# Patient Record
Sex: MALE | Race: OTHER | HISPANIC OR LATINO | URBAN - METROPOLITAN AREA
[De-identification: names, ages, dates, MRNs, and addresses within clinical notes are randomized per-mention and may not be internally consistent; named-entity substitution may affect disease eponyms.]

---

## 2020-03-24 ENCOUNTER — EMERGENCY (EMERGENCY)
Facility: HOSPITAL | Age: 50
LOS: 1 days | Discharge: HOME | End: 2020-03-24
Attending: EMERGENCY MEDICINE
Payer: SELF-PAY

## 2020-03-24 VITALS
TEMPERATURE: 100 F | RESPIRATION RATE: 18 BRPM | SYSTOLIC BLOOD PRESSURE: 122 MMHG | DIASTOLIC BLOOD PRESSURE: 92 MMHG | OXYGEN SATURATION: 99 % | HEART RATE: 87 BPM

## 2020-03-24 VITALS
OXYGEN SATURATION: 97 % | HEART RATE: 104 BPM | RESPIRATION RATE: 20 BRPM | HEIGHT: 61 IN | DIASTOLIC BLOOD PRESSURE: 67 MMHG | TEMPERATURE: 101 F | SYSTOLIC BLOOD PRESSURE: 124 MMHG | WEIGHT: 162.04 LBS

## 2020-03-24 LAB
ALBUMIN SERPL ELPH-MCNC: 4.7 G/DL — SIGNIFICANT CHANGE UP (ref 3.3–5)
ALP SERPL-CCNC: 72 U/L — SIGNIFICANT CHANGE UP (ref 40–120)
ALT FLD-CCNC: 48 U/L — HIGH (ref 10–45)
ANION GAP SERPL CALC-SCNC: 12 MMOL/L — SIGNIFICANT CHANGE UP (ref 5–17)
APTT BLD: 28.6 SEC — SIGNIFICANT CHANGE UP (ref 27.5–36.3)
AST SERPL-CCNC: 43 U/L — HIGH (ref 10–40)
BASOPHILS # BLD AUTO: 0.01 K/UL — SIGNIFICANT CHANGE UP (ref 0–0.2)
BASOPHILS NFR BLD AUTO: 0.2 % — SIGNIFICANT CHANGE UP (ref 0–2)
BILIRUB SERPL-MCNC: 0.3 MG/DL — SIGNIFICANT CHANGE UP (ref 0.2–1.2)
BUN SERPL-MCNC: 9 MG/DL — SIGNIFICANT CHANGE UP (ref 7–23)
CALCIUM SERPL-MCNC: 9.2 MG/DL — SIGNIFICANT CHANGE UP (ref 8.4–10.5)
CHLORIDE SERPL-SCNC: 98 MMOL/L — SIGNIFICANT CHANGE UP (ref 96–108)
CO2 SERPL-SCNC: 26 MMOL/L — SIGNIFICANT CHANGE UP (ref 22–31)
CREAT SERPL-MCNC: 1.01 MG/DL — SIGNIFICANT CHANGE UP (ref 0.5–1.3)
EOSINOPHIL # BLD AUTO: 0.04 K/UL — SIGNIFICANT CHANGE UP (ref 0–0.5)
EOSINOPHIL NFR BLD AUTO: 0.7 % — SIGNIFICANT CHANGE UP (ref 0–6)
GAS PNL BLDV: SIGNIFICANT CHANGE UP
GLUCOSE SERPL-MCNC: 102 MG/DL — HIGH (ref 70–99)
HCT VFR BLD CALC: 45.6 % — SIGNIFICANT CHANGE UP (ref 39–50)
HGB BLD-MCNC: 14.5 G/DL — SIGNIFICANT CHANGE UP (ref 13–17)
IMM GRANULOCYTES NFR BLD AUTO: 0.2 % — SIGNIFICANT CHANGE UP (ref 0–1.5)
INR BLD: 1.03 RATIO — SIGNIFICANT CHANGE UP (ref 0.88–1.16)
LYMPHOCYTES # BLD AUTO: 0.95 K/UL — LOW (ref 1–3.3)
LYMPHOCYTES # BLD AUTO: 17.6 % — SIGNIFICANT CHANGE UP (ref 13–44)
MCHC RBC-ENTMCNC: 30 PG — SIGNIFICANT CHANGE UP (ref 27–34)
MCHC RBC-ENTMCNC: 31.8 GM/DL — LOW (ref 32–36)
MCV RBC AUTO: 94.2 FL — SIGNIFICANT CHANGE UP (ref 80–100)
MONOCYTES # BLD AUTO: 0.74 K/UL — SIGNIFICANT CHANGE UP (ref 0–0.9)
MONOCYTES NFR BLD AUTO: 13.7 % — SIGNIFICANT CHANGE UP (ref 2–14)
NEUTROPHILS # BLD AUTO: 3.64 K/UL — SIGNIFICANT CHANGE UP (ref 1.8–7.4)
NEUTROPHILS NFR BLD AUTO: 67.6 % — SIGNIFICANT CHANGE UP (ref 43–77)
NRBC # BLD: 0 /100 WBCS — SIGNIFICANT CHANGE UP (ref 0–0)
PLATELET # BLD AUTO: 143 K/UL — LOW (ref 150–400)
POTASSIUM SERPL-MCNC: 4.3 MMOL/L — SIGNIFICANT CHANGE UP (ref 3.5–5.3)
POTASSIUM SERPL-SCNC: 4.3 MMOL/L — SIGNIFICANT CHANGE UP (ref 3.5–5.3)
PROT SERPL-MCNC: 7.8 G/DL — SIGNIFICANT CHANGE UP (ref 6–8.3)
PROTHROM AB SERPL-ACNC: 11.9 SEC — SIGNIFICANT CHANGE UP (ref 10–12.9)
RBC # BLD: 4.84 M/UL — SIGNIFICANT CHANGE UP (ref 4.2–5.8)
RBC # FLD: 13.4 % — SIGNIFICANT CHANGE UP (ref 10.3–14.5)
SODIUM SERPL-SCNC: 136 MMOL/L — SIGNIFICANT CHANGE UP (ref 135–145)
WBC # BLD: 5.39 K/UL — SIGNIFICANT CHANGE UP (ref 3.8–10.5)
WBC # FLD AUTO: 5.39 K/UL — SIGNIFICANT CHANGE UP (ref 3.8–10.5)

## 2020-03-24 PROCEDURE — 80053 COMPREHEN METABOLIC PANEL: CPT

## 2020-03-24 PROCEDURE — 82947 ASSAY GLUCOSE BLOOD QUANT: CPT

## 2020-03-24 PROCEDURE — 99284 EMERGENCY DEPT VISIT MOD MDM: CPT | Mod: 25

## 2020-03-24 PROCEDURE — 85014 HEMATOCRIT: CPT

## 2020-03-24 PROCEDURE — 85027 COMPLETE CBC AUTOMATED: CPT

## 2020-03-24 PROCEDURE — 83605 ASSAY OF LACTIC ACID: CPT

## 2020-03-24 PROCEDURE — 96374 THER/PROPH/DIAG INJ IV PUSH: CPT

## 2020-03-24 PROCEDURE — 82435 ASSAY OF BLOOD CHLORIDE: CPT

## 2020-03-24 PROCEDURE — 96361 HYDRATE IV INFUSION ADD-ON: CPT

## 2020-03-24 PROCEDURE — 84295 ASSAY OF SERUM SODIUM: CPT

## 2020-03-24 PROCEDURE — 85610 PROTHROMBIN TIME: CPT

## 2020-03-24 PROCEDURE — 71045 X-RAY EXAM CHEST 1 VIEW: CPT | Mod: 26

## 2020-03-24 PROCEDURE — 93005 ELECTROCARDIOGRAM TRACING: CPT

## 2020-03-24 PROCEDURE — 99285 EMERGENCY DEPT VISIT HI MDM: CPT

## 2020-03-24 PROCEDURE — 82803 BLOOD GASES ANY COMBINATION: CPT

## 2020-03-24 PROCEDURE — 84132 ASSAY OF SERUM POTASSIUM: CPT

## 2020-03-24 PROCEDURE — 85730 THROMBOPLASTIN TIME PARTIAL: CPT

## 2020-03-24 PROCEDURE — 87040 BLOOD CULTURE FOR BACTERIA: CPT

## 2020-03-24 PROCEDURE — 71045 X-RAY EXAM CHEST 1 VIEW: CPT

## 2020-03-24 PROCEDURE — 82330 ASSAY OF CALCIUM: CPT

## 2020-03-24 RX ORDER — SODIUM CHLORIDE 9 MG/ML
1000 INJECTION INTRAMUSCULAR; INTRAVENOUS; SUBCUTANEOUS ONCE
Refills: 0 | Status: COMPLETED | OUTPATIENT
Start: 2020-03-24 | End: 2020-03-24

## 2020-03-24 RX ORDER — AZITHROMYCIN 500 MG/1
500 TABLET, FILM COATED ORAL ONCE
Refills: 0 | Status: COMPLETED | OUTPATIENT
Start: 2020-03-24 | End: 2020-03-24

## 2020-03-24 RX ORDER — AZITHROMYCIN 500 MG/1
500 TABLET, FILM COATED ORAL ONCE
Refills: 0 | Status: DISCONTINUED | OUTPATIENT
Start: 2020-03-24 | End: 2020-03-24

## 2020-03-24 RX ORDER — AZITHROMYCIN 500 MG/1
1 TABLET, FILM COATED ORAL
Qty: 5 | Refills: 0
Start: 2020-03-24 | End: 2020-03-28

## 2020-03-24 RX ORDER — ACETAMINOPHEN 500 MG
975 TABLET ORAL ONCE
Refills: 0 | Status: COMPLETED | OUTPATIENT
Start: 2020-03-24 | End: 2020-03-24

## 2020-03-24 RX ORDER — SODIUM CHLORIDE 9 MG/ML
1500 INJECTION INTRAMUSCULAR; INTRAVENOUS; SUBCUTANEOUS ONCE
Refills: 0 | Status: COMPLETED | OUTPATIENT
Start: 2020-03-24 | End: 2020-03-24

## 2020-03-24 RX ORDER — CEFTRIAXONE 500 MG/1
1000 INJECTION, POWDER, FOR SOLUTION INTRAMUSCULAR; INTRAVENOUS ONCE
Refills: 0 | Status: COMPLETED | OUTPATIENT
Start: 2020-03-24 | End: 2020-03-24

## 2020-03-24 RX ADMIN — SODIUM CHLORIDE 1500 MILLILITER(S): 9 INJECTION INTRAMUSCULAR; INTRAVENOUS; SUBCUTANEOUS at 14:15

## 2020-03-24 RX ADMIN — Medication 975 MILLIGRAM(S): at 13:01

## 2020-03-24 RX ADMIN — SODIUM CHLORIDE 1000 MILLILITER(S): 9 INJECTION INTRAMUSCULAR; INTRAVENOUS; SUBCUTANEOUS at 15:37

## 2020-03-24 RX ADMIN — CEFTRIAXONE 100 MILLIGRAM(S): 500 INJECTION, POWDER, FOR SOLUTION INTRAMUSCULAR; INTRAVENOUS at 15:37

## 2020-03-24 RX ADMIN — SODIUM CHLORIDE 1500 MILLILITER(S): 9 INJECTION INTRAMUSCULAR; INTRAVENOUS; SUBCUTANEOUS at 13:02

## 2020-03-24 RX ADMIN — AZITHROMYCIN 500 MILLIGRAM(S): 500 TABLET, FILM COATED ORAL at 15:36

## 2020-03-24 RX ADMIN — Medication 975 MILLIGRAM(S): at 13:50

## 2020-03-24 NOTE — ED PROVIDER NOTE - ATTENDING CONTRIBUTION TO CARE
49y hisp male, PMH neg, No DM,HTN.Habits, comes to ed complains of 3d hx of tactile fever, cough productive of scant sputum, bodyaches. NO nvdc.loc, PE WDWN male with mild cough looking mildly fatigued. NCAT neck supple chest clear anterior & posterior abd soft +bs no mass guarding. cv no rubs, gallops or murmurs,neuro no focal defects  Leandro Julien MD, Facep

## 2020-03-24 NOTE — ED PROVIDER NOTE - PATIENT PORTAL LINK FT
You can access the FollowMyHealth Patient Portal offered by [facility] by registering at the following website: [email or web address]. By joining [employer]’s FollowMyHealth portal, you will also be able to view your health information using other applications (apps) compatible with our system.

## 2020-03-24 NOTE — ED PROVIDER NOTE - CLINICAL SUMMARY MEDICAL DECISION MAKING FREE TEXT BOX
In accordance with the current estabilished [facility] protocol for suspected COVID-19 patients, this patient does not meet criteria for urgent COVID-19 PCR testing in the ED. The patient will be advised to self-quarantine for 14 days and will be logged into our institutional [employer] database for surveillance.  will get CXR labs vbg

## 2020-03-24 NOTE — ED ADULT NURSE NOTE - OBJECTIVE STATEMENT
1310 49 yr old  c/o fever, cough, back pain, chest pain and SOB since last night. A&Ox4. ambulatory. Contact and airborne isolation maintained. denies recent travel or sick contacts.  Dry cough audible. Denies palpitations or dizziness.

## 2020-03-24 NOTE — ED PROVIDER NOTE - PHYSICAL EXAMINATION
Gen: AAOx3, non-toxic  Head: NCAT  HEENT: EOMI, PERRLA, oral mucosa moist, normal conjunctiva  Lung: CTAB, no respiratory distress, no wheezes/rhonchi/rales B/L, speaking in full sentences  CV: RRR, no murmurs, rubs or gallops  Abd: soft, NTND, no guarding, no CVA tenderness, no rebound tenderness  MSK: no visible deformities, full range of motion of all 4 exts  Neuro: No focal sensory or motor deficits  Skin: Warm, well perfused, no rash  Psych: normal affect.   ~Alisson Reeves MD

## 2020-03-24 NOTE — ED PROVIDER NOTE - NS ED ROS FT
GENERAL: + fever or chills, EYES: no change in vision, HEENT: no trouble speaking, CARDIAC: no chest pain, palpitation PULMONARY:+ cough or SOB, GI: no abdominal pain, no nausea, no vomiting, no diarrhea or constipation, : No changes in urination, SKIN: no rashes, NEURO: no headache,  MSK: + muscle pain ~Aluko Gift, MD

## 2020-03-24 NOTE — ED PROVIDER NOTE - PROGRESS NOTE DETAILS
Endorsed to Dr Danielle Julien MD, Facep Alisson Reeves MD: CXR showed PNA. Sat 99% on RA. Pt well appearing and asymptomatic. Pt is ambulatory and tolerating PO. Spoke with pt about return precautions. Pt agrees to follow up with their PCP. Pt ready for discharge This patient is a non-[employer] employee and was seen in the Emergency Department.  Staff did use appropiate PPE.

## 2020-03-24 NOTE — ED PROVIDER NOTE - NSFOLLOWUPINSTRUCTIONS_ED_ALL_ED_FT
1.  Take tylenol (650 mg by mouth) alternating with motrin (600 mg by mouth) every 3 hours.  (For ex.  Tylenol then 3 hours later motrin then 3 hours later tylenol etc.)  2.  Increase hydration of fluids (water, gatorade, broth, popsicles etc.).  Avoid alcohol while ill.  3.  Follow-up with primary care for reassessment in 2-3 days.  Also, please get the flu shot (if you have not already had one this season) at any pharmacy or from your primary care doctor once your symptoms resolve.   4.  Return to the ER should you develop high fevers greater than 103-104 or fevers unresponsive to tylenol/motrin, should you develop worsened shortness of breath or any respiratory distress including but not limited to inability to catch breath/inability to walk without marked shortness of breath or light-headedness, neck pain/stiffness, confusion, inability to tolerate oral intake or should you pass out.  5.  Please avoid any close or prolonged contact with other individuals, in particular: infants/elderly or individuals who are known to be immune suppressed.  6.  You were not tested for Coronavirus today as you do not require medical admission.  Should you develop any of the above changes to your symptoms please return to the emergency department as you may then require supportive care regardless of the source of your symptoms.     You received verbal instructions and did not sign because of the risk of infection, and expressed understanding of the discharge instructions. Please followup with your doctor (call) and consider follow up in their office. Please practice good hand hygiene and social distancing. If fever, cough, shortness of breath, or any worse symptoms return to the ER.  If you have symptoms of any kind please quarantine yourself for 14 days from start of symptoms.  You can call [phone] for any Covid related questions or your local department of health. ([facility] [phone], or [facility]).

## 2020-03-24 NOTE — ED PROVIDER NOTE - OBJECTIVE STATEMENT
Alisson Reeves MD: 48 yo M no PMH  presents out of a concern for a possible COVID infection. They have not recently travelled to any of the current high risk areas and have not been in close contact with a known positive COVID19-infected patient.  This patient complains of URI-like symptoms: XXX sore, throat, cough, body aches, runny nose.  Associated Symptoms:  no INGRAM, inability to breathe, edema, or chest pain.   Duration of symptoms:    Better/worse: Alisson Reeves MD: 48 yo M no PMH  presents out of a concern for a possible COVID infection. They have not recently travelled to any of the current high risk areas and have not been in close contact with a known positive COVID19-infected patient.  This patient complains of URI-like symptoms:  cough, body aches, runny nose.  Associated Symptoms:  no INGRAM, inability to breathe, edema, or chest pain.   Duration of symptoms:  3 days  Better/worse: same

## 2020-03-29 LAB
CULTURE RESULTS: SIGNIFICANT CHANGE UP
CULTURE RESULTS: SIGNIFICANT CHANGE UP
SPECIMEN SOURCE: SIGNIFICANT CHANGE UP
SPECIMEN SOURCE: SIGNIFICANT CHANGE UP

## 2022-04-24 ENCOUNTER — EMERGENCY (EMERGENCY)
Facility: HOSPITAL | Age: 52
LOS: 1 days | Discharge: HOME | End: 2022-04-24
Attending: CLINIC/CENTER
Payer: COMMERCIAL

## 2022-04-24 VITALS
HEART RATE: 75 BPM | TEMPERATURE: 98 F | HEIGHT: 61 IN | DIASTOLIC BLOOD PRESSURE: 87 MMHG | SYSTOLIC BLOOD PRESSURE: 125 MMHG | WEIGHT: 175.93 LBS | RESPIRATION RATE: 18 BRPM | OXYGEN SATURATION: 97 %

## 2022-04-24 PROCEDURE — 99283 EMERGENCY DEPT VISIT LOW MDM: CPT

## 2022-04-25 VITALS
HEART RATE: 76 BPM | RESPIRATION RATE: 15 BRPM | TEMPERATURE: 98 F | SYSTOLIC BLOOD PRESSURE: 106 MMHG | DIASTOLIC BLOOD PRESSURE: 72 MMHG | OXYGEN SATURATION: 99 %

## 2022-04-25 PROBLEM — Z78.9 OTHER SPECIFIED HEALTH STATUS: Chronic | Status: ACTIVE | Noted: 2020-03-24

## 2022-04-25 PROCEDURE — 99284 EMERGENCY DEPT VISIT MOD MDM: CPT

## 2022-04-25 RX ORDER — IBUPROFEN 200 MG
400 TABLET ORAL ONCE
Refills: 0 | Status: COMPLETED | OUTPATIENT
Start: 2022-04-25 | End: 2022-04-25

## 2022-04-25 RX ORDER — NITROGLYCERIN 6.5 MG
1 CAPSULE, EXTENDED RELEASE ORAL ONCE
Refills: 0 | Status: COMPLETED | OUTPATIENT
Start: 2022-04-25 | End: 2022-04-25

## 2022-04-25 RX ORDER — MINERAL OIL
133 OIL (ML) MISCELLANEOUS ONCE
Refills: 0 | Status: COMPLETED | OUTPATIENT
Start: 2022-04-25 | End: 2022-04-25

## 2022-04-25 RX ORDER — ACETAMINOPHEN 500 MG
650 TABLET ORAL ONCE
Refills: 0 | Status: COMPLETED | OUTPATIENT
Start: 2022-04-25 | End: 2022-04-25

## 2022-04-25 RX ORDER — MULTIVIT WITH MIN/MFOLATE/K2 340-15/3 G
1 POWDER (GRAM) ORAL ONCE
Refills: 0 | Status: COMPLETED | OUTPATIENT
Start: 2022-04-25 | End: 2022-04-25

## 2022-04-25 RX ADMIN — Medication 1 APPLICATION(S): at 02:17

## 2022-04-25 RX ADMIN — Medication 1 BOTTLE: at 01:19

## 2022-04-25 RX ADMIN — Medication 400 MILLIGRAM(S): at 01:20

## 2022-04-25 RX ADMIN — Medication 133 MILLILITER(S): at 03:23

## 2022-04-25 RX ADMIN — Medication 650 MILLIGRAM(S): at 01:20

## 2022-04-25 NOTE — ED ADULT NURSE NOTE - NSIMPLEMENTINTERV_GEN_ALL_ED
Implemented All Universal Safety Interventions:  [place] to call system. Call bell, personal items and telephone within reach. Instruct patient to call for assistance. Room bathroom lighting operational. Non-slip footwear when patient is off stretcher. Physically safe environment: no spills, clutter or unnecessary equipment. Stretcher in lowest position, wheels locked, appropriate side rails in place.

## 2022-04-25 NOTE — ED PROVIDER NOTE - NSFOLLOWUPINSTRUCTIONS_ED_ALL_ED_FT
You were seen today in the ED for anal pain. An external hemorrhoid was seen on rectal exam.   Hemorrhoids are swollen blood vessels inside your rectum (internal hemorrhoids) or on your anus (external hemorrhoids).    Please return to the ED if:  -You have severe pain in your rectum or around your anus.  -You have severe pain in your abdomen and you are vomiting.  -You have bleeding from your anus that soaks through your underwear.  -Your hemorrhoid looks or feels more swollen than usual/feels very hard  -You see or feel tissue coming through your anus.    You can use over the counter pain medications (Tylenol or NSAIDs such as Ibuprofen) as needed and topical creams to relieve your pain.     Avoid heavy lifting and straining during bowel movements. You can try eating foods high in fiber (fruits, vegetables, and whole grains) and drink plenty of fluids. You were seen today in the ED for anal pain. An external hemorrhoid was seen on rectal exam.   Hemorrhoids are swollen blood vessels inside your rectum (internal hemorrhoids) or on your anus (external hemorrhoids).    Please return to the ED if:  -You have severe pain in your rectum or around your anus.  -You have severe pain in your abdomen and you are vomiting.  -You have bleeding from your anus that soaks through your underwear.  -Your hemorrhoid looks or feels more swollen than usual/feels very hard  -You see or feel tissue coming through your anus.    You can use over the counter pain medications (Tylenol or NSAIDs such as Ibuprofen) as needed and topical creams to relieve your pain.     Avoid heavy lifting and straining during bowel movements. You can try eating foods high in fiber (fruits, vegetables, and whole grains) and drink plenty of fluids.    Las hemorroides son venas inflamadas adentro o alrededor del recto o del ano. Hay dos tipos de hemorroides:  •Hemorroides internas. Se tanya en las venas del interior del recto. Pueden abultarse hacia afuera, irritarse y doler.      •Hemorroides externas. Se producen en las venas externas del ano y pueden sentirse morgan un bulto o vanda hinchada, dura y dolorosa cerca del ano.      La mayoría de las hemorroides no causan problemas graves y se pueden controlar con tratamientos caseros morgan los cambios en la dieta y el estilo de cha. Si los tratamientos caseros no ayudan con los síntomas, se pueden realizar procedimientos para reducir o extirpar las hemorroides.      ¿Cuáles son las causas?    La causa de esta afección es el aumento de la presión en la vanda anal. Esta presión puede ser causada por distintos factores, por ejemplo:  •Estreñimiento.      •Hacer un gran esfuerzo para defecar.      •Diarrea.      •Embarazo.      •Obesidad.      •Estar sentado ben largos períodos de tiempo.      •Levantar objetos pesados u otras actividades que impliquen esfuerzo.      •Sexo anal.      •Andar en bicicleta por un zak período de tiempo.        ¿Cuáles son los signos o los síntomas?    Los síntomas de esta afección incluyen los siguientes:  •Dolor.      •Picazón o irritación anal.      •Sangrado rectal.      •Pérdida de materia fecal (heces).      •Inflamación anal.      •Alexander o más bultos alrededor del ano.        ¿Cómo se diagnostica?    Esta afección se diagnostica frecuentemente a través de un examen visual. Posiblemente le realicen otros tipos de pruebas o estudios, morgan los siguientes:  •Un examen que implica palpar el área rectal con la mano enguantada (examen rectal digital).      •Un examen del canal anal que se realiza utilizando un pequeño tubo (anoscopio).      •Análisis de eliseo si ha perdido emma cantidad significativa de eliseo.      •Emma prueba que consiste en la observación del interior del colon utilizando un tubo flexible con emma cámara en el extremo (sigmoidoscopia o colonoscopía).        ¿Cómo se trata?    Esta afección generalmente se puede tratar en el hogar. Sin embargo, se pueden realizar diversos procedimientos si los cambios en la dieta, en el estilo de cha y otros tratamientos caseros no alivian los síntomas. Estos procedimientos pueden ayudar a reducir o extirpar las hemorroides completamente. Algunos de estos procedimientos son quirúrgicos y otros no. Algunos de los procedimientos más frecuentes son los siguientes:  •Ligadura con calderón elástica. Las bandas elásticas se colocan en la base de las hemorroides para interrumpir oconnor irrigación de eliseo.      •Escleroterapia. Se inyecta un medicamento en las hemorroides para reducir oconnor tamaño.      •Coagulación con dayana infrarroja. Se utiliza un tipo de energía lumínica para eliminar las hemorroides.      •Hemorroidectomía. Las hemorroides se extirpan con cirugía y las venas que las irrigan se atan.      •Hemorroidopexia con grapas. El cirujano engrapa la base de las hemorroides a la pared del recto.        Siga estas indicaciones en oconnor casa:      Comida y bebida      •Consuma alimentos con alto contenido de fibra, morgan cereales integrales, porotos, abdulkadir secos, frutas y verduras.      •Pregúntele a oconnor médico acerca de nicky productos con fibra añadida en ellos (complementos de fibra).      •Disminuya la cantidad de grasa de la dieta. Crouch se puede lograr consumiendo productos lácteos con bajo contenido de grasas, ingiriendo johnathan cantidad de claudia jacinto y evitando los alimentos procesados.      •Meli suficiente líquido morgan para mantener la orina de color amarillo pálido.        Control del dolor y la hinchazón      •[place] felice de asiento tibios ben 20 minutos, 3 o 4 veces por día para calmar el dolor y las molestias. Puede hacer esto en emma bañera o usar un dispositivo portátil para baño de asiento que se coloca sobre el inodoro.    •Si se lo indican, aplique hielo en la vanda afectada. Usar compresas de hielo entre los felice de asiento puede ser efectivo.  •Ponga el hielo en emma bolsa plástica.      •Coloque emma toalla entre la piel y la bolsa.      •Coloque el hielo ben 20 minutos, 2 a 3 veces por día.        Indicaciones generales     •[place] los medicamentos de venta home y los recetados solamente morgan se lo haya indicado el médico.      •Aplíquese los medicamentos, cremas o supositorios morgan se lo hayan indicado.      •Robert ejercicio con regularidad. Consulte al médico qué cantidad y qué tipo de ejercicio es mejor para usted. En general, debe realizar al menos 30 minutos de ejercicio moderado la mayoría de los días de la semana (150 minutos cada semana). [place]ouch puede incluir actividades morgan caminar, andar en bicicleta o practicar yoga.      •Vaya al baño cuando sienta la necesidad de defecar. No espere.      •Evite hacer fuerza en las deposiciones.      •Mantenga la vanda anal limpia y seca. Use papel higiénico húmedo o toallitas humedecidas después de las deposiciones.      • No pase mucho tiempo sentado en el inodoro. [place] aumenta la afluencia de eliseo y el dolor.      •Concurra a todas las visitas de seguimiento morgan se lo haya indicado el médico. Crouch es importante.        Comuníquese con un médico si tiene:    •Aumento del dolor y la hinchazón que no puede controlar con medicamentos o tratamiento.      •No puede defecar o lo hace con dificultad.      •Dolor o tiene inflamación fuera de la vanda de las hemorroides.        Solicite ayuda inmediatamente si tiene:    •Hemorragia descontrolada en el recto.        Resumen    •Las hemorroides son venas inflamadas adentro o alrededor del recto o del ano.      •La mayoría de las hemorroides se pueden controlar con tratamientos caseros morgan cambios en la dieta y el estilo de cha.      •Nicky felice de asiento con agua tibia puede ayudar a aliviar el dolor y las molestias.      •En los casos graves, se pueden realizar procedimientos o emma cirugía para reducir o extirpar las hemorroides.      Esta información no tiene morgan fin reemplazar el consejo del médico. Asegúrese de hacerle al médico cualquier pregunta que tenga.

## 2022-04-25 NOTE — ED PROVIDER NOTE - PHYSICAL EXAMINATION
Gen: WDWN, comfortable appearing   HEENT: EOMI, no nasal discharge, mucous membranes moist  CV: RRR, +S1/S2, no M/R/G, equal b/l radial pulses 2+  Resp: CTAB, no W/R/R, no increased WOB   GI: Abdomen soft non-distended, NTTP, no masses/organomegaly   Rectal: Large external hemorrhoid; TTP with no blood; no evidence of thrombosis; normal skin color, soft. No internal hemorrhoids, no enlargement of prostate   MSK/Skin: No CVA tenderness, no open wounds, no bruising, no LE edema  Neuro: A&Ox4, moving all 4 extremities spontaneously, gross sensation intact in UE and LE BL  Psych: appropriate mood

## 2022-04-25 NOTE — ED PROVIDER NOTE - ATTENDING CONTRIBUTION TO CARE
I, Chaitanya Gabriel, performed a history and physical exam of the patient and discussed their management with the resident and /or advanced care provider. I reviewed the resident and /or ACP's note and agree with the documented findings and plan of care. I was present and available for all procedures.    Agree with above except noted:

## 2022-04-25 NOTE — ED ADULT NURSE NOTE - OBJECTIVE STATEMENT
51 y old male with no significant PMH presents to the ED from home c/o pain in rectum x 3 days. Pt reports pain got worse yesterday. Pt reports one abnormal bowel movement yesterday morning (4/24) and no bowel movements since then. Pt states pain is constant and nonradiating in rectum. Pt states "it feels like a bulge in my rectum." Denies HA, fevers, chills, CP, SOB, abd pain, n/v, weakness. Pt A&O x4, ambulatory. Respirations nonlabored on RA. Peripheral pulses strong. SKin warm, dry and intact. Bed locked in lowest position, side rails up and call bell in reach.

## 2022-04-25 NOTE — ED PROVIDER NOTE - PATIENT PORTAL LINK FT
You can access the FollowMyHealth Patient Portal offered by [facility] by registering at the following website: [email or web address]. By joining [employer]’s FollowMyHealth portal, you will also be able to view your health information using other applications (apps) compatible with our system.

## 2022-04-25 NOTE — ED PROVIDER NOTE - NS ED ROS FT
Gen: Denies fever  CV: Denies palpitations  Skin: Denies rash, erythema, color changes  Resp: Denies SOB, cough  Endo: Denies sensitivity to heat, cold  GI: Denies diarrhea, nausea, vomiting  Msk: Denies back pain  : Denies dysuria, increased frequency  Neuro: Denies weakness

## 2022-04-25 NOTE — ED PROVIDER NOTE - CLINICAL SUMMARY MEDICAL DECISION MAKING FREE TEXT BOX
52 y/o male with no pmhx presenting with rectal pain x 3 days, intermittently constipated; on exam large external hemorrhoid; TTP with no blood; no evidence of thrombosis; normal skin color, soft. No internal hemorrhoids, no enlargement of prostate. Abdomen NTTP. Pain control, laxatives/enema, and reassess

## 2022-04-25 NOTE — ED PROVIDER NOTE - OBJECTIVE STATEMENT
52 y/o male with no pmhx presenting with rectal pain x 3 days. Constant pain, worse with BMs, with no blood in stool. Intermittently constipated for past couple of days; Last BM 2 days ago. No fevers/chills, n/v/d, cough, rashes, or changes in urination. No hx of anal intercourse 52 y/o male with no pmhx presenting with rectal pain x 3 days. Constant pain, worse with BMs, with no blood in stool. Intermittently constipated for past couple of days; Last BM 2 days ago. No fevers/chills, n/v/d, cough, rashes, or changes in urination. No hx of anal intercourse.

## 2022-08-15 NOTE — ED ADULT NURSE NOTE - CAS TRG GEN SKIN CONDITION
Detail Level: Detailed Add 02487 Cpt? (Important Note: In 2017 The Use Of 04995 Is Being Tracked By Cms To Determine Future Global Period Reimbursement For Global Periods): yes Warm/Dry

## 2022-10-24 ENCOUNTER — EMERGENCY (EMERGENCY)
Facility: HOSPITAL | Age: 52
LOS: 1 days | Discharge: HOME | End: 2022-10-24
Attending: STUDENT IN AN ORGANIZED HEALTH CARE EDUCATION/TRAINING PROGRAM | Admitting: STUDENT IN AN ORGANIZED HEALTH CARE EDUCATION/TRAINING PROGRAM

## 2022-10-24 VITALS
TEMPERATURE: 98 F | RESPIRATION RATE: 16 BRPM | HEART RATE: 99 BPM | DIASTOLIC BLOOD PRESSURE: 70 MMHG | SYSTOLIC BLOOD PRESSURE: 110 MMHG | OXYGEN SATURATION: 100 %

## 2022-10-24 VITALS
SYSTOLIC BLOOD PRESSURE: 112 MMHG | DIASTOLIC BLOOD PRESSURE: 76 MMHG | RESPIRATION RATE: 16 BRPM | HEART RATE: 115 BPM | TEMPERATURE: 100 F | OXYGEN SATURATION: 96 %

## 2022-10-24 LAB
ALBUMIN SERPL ELPH-MCNC: 4.4 G/DL — SIGNIFICANT CHANGE UP (ref 3.3–5)
ALP SERPL-CCNC: 72 U/L — SIGNIFICANT CHANGE UP (ref 40–120)
ALT FLD-CCNC: 32 U/L — SIGNIFICANT CHANGE UP (ref 4–41)
ANION GAP SERPL CALC-SCNC: 16 MMOL/L — HIGH (ref 7–14)
AST SERPL-CCNC: 45 U/L — HIGH (ref 4–40)
BASOPHILS # BLD AUTO: 0.05 K/UL — SIGNIFICANT CHANGE UP (ref 0–0.2)
BASOPHILS NFR BLD AUTO: 0.5 % — SIGNIFICANT CHANGE UP (ref 0–2)
BILIRUB SERPL-MCNC: 0.3 MG/DL — SIGNIFICANT CHANGE UP (ref 0.2–1.2)
BUN SERPL-MCNC: 17 MG/DL — SIGNIFICANT CHANGE UP (ref 7–23)
CALCIUM SERPL-MCNC: 9.2 MG/DL — SIGNIFICANT CHANGE UP (ref 8.4–10.5)
CHLORIDE SERPL-SCNC: 100 MMOL/L — SIGNIFICANT CHANGE UP (ref 98–107)
CO2 SERPL-SCNC: 19 MMOL/L — LOW (ref 22–31)
CREAT SERPL-MCNC: 1.01 MG/DL — SIGNIFICANT CHANGE UP (ref 0.5–1.3)
EGFR: 91 ML/MIN/1.73M2 — SIGNIFICANT CHANGE UP
EOSINOPHIL # BLD AUTO: 0 K/UL — SIGNIFICANT CHANGE UP (ref 0–0.5)
EOSINOPHIL NFR BLD AUTO: 0 % — SIGNIFICANT CHANGE UP (ref 0–6)
GLUCOSE SERPL-MCNC: 119 MG/DL — HIGH (ref 70–99)
HCT VFR BLD CALC: 43.2 % — SIGNIFICANT CHANGE UP (ref 39–50)
HGB BLD-MCNC: 14.2 G/DL — SIGNIFICANT CHANGE UP (ref 13–17)
IANC: 7.59 K/UL — HIGH (ref 1.8–7.4)
IMM GRANULOCYTES NFR BLD AUTO: 0.5 % — SIGNIFICANT CHANGE UP (ref 0–0.9)
LYMPHOCYTES # BLD AUTO: 1.03 K/UL — SIGNIFICANT CHANGE UP (ref 1–3.3)
LYMPHOCYTES # BLD AUTO: 11.1 % — LOW (ref 13–44)
MCHC RBC-ENTMCNC: 30.7 PG — SIGNIFICANT CHANGE UP (ref 27–34)
MCHC RBC-ENTMCNC: 32.9 GM/DL — SIGNIFICANT CHANGE UP (ref 32–36)
MCV RBC AUTO: 93.3 FL — SIGNIFICANT CHANGE UP (ref 80–100)
MONOCYTES # BLD AUTO: 0.58 K/UL — SIGNIFICANT CHANGE UP (ref 0–0.9)
MONOCYTES NFR BLD AUTO: 6.2 % — SIGNIFICANT CHANGE UP (ref 2–14)
NEUTROPHILS # BLD AUTO: 7.59 K/UL — HIGH (ref 1.8–7.4)
NEUTROPHILS NFR BLD AUTO: 81.7 % — HIGH (ref 43–77)
NRBC # BLD: 0 /100 WBCS — SIGNIFICANT CHANGE UP (ref 0–0)
NRBC # FLD: 0 K/UL — SIGNIFICANT CHANGE UP (ref 0–0)
PLATELET # BLD AUTO: 193 K/UL — SIGNIFICANT CHANGE UP (ref 150–400)
POTASSIUM SERPL-MCNC: 5.3 MMOL/L — SIGNIFICANT CHANGE UP (ref 3.5–5.3)
POTASSIUM SERPL-SCNC: 5.3 MMOL/L — SIGNIFICANT CHANGE UP (ref 3.5–5.3)
PROT SERPL-MCNC: 7.9 G/DL — SIGNIFICANT CHANGE UP (ref 6–8.3)
RBC # BLD: 4.63 M/UL — SIGNIFICANT CHANGE UP (ref 4.2–5.8)
RBC # FLD: 13 % — SIGNIFICANT CHANGE UP (ref 10.3–14.5)
SODIUM SERPL-SCNC: 135 MMOL/L — SIGNIFICANT CHANGE UP (ref 135–145)
TROPONIN T, HIGH SENSITIVITY RESULT: 7 NG/L — SIGNIFICANT CHANGE UP
WBC # BLD: 9.3 K/UL — SIGNIFICANT CHANGE UP (ref 3.8–10.5)
WBC # FLD AUTO: 9.3 K/UL — SIGNIFICANT CHANGE UP (ref 3.8–10.5)

## 2022-10-24 PROCEDURE — 93010 ELECTROCARDIOGRAM REPORT: CPT

## 2022-10-24 PROCEDURE — 99285 EMERGENCY DEPT VISIT HI MDM: CPT

## 2022-10-24 PROCEDURE — 71045 X-RAY EXAM CHEST 1 VIEW: CPT | Mod: 26

## 2022-10-24 RX ORDER — ACETAMINOPHEN 500 MG
650 TABLET ORAL ONCE
Refills: 0 | Status: COMPLETED | OUTPATIENT
Start: 2022-10-24 | End: 2022-10-24

## 2022-10-24 RX ORDER — SODIUM CHLORIDE 9 MG/ML
1000 INJECTION INTRAMUSCULAR; INTRAVENOUS; SUBCUTANEOUS ONCE
Refills: 0 | Status: COMPLETED | OUTPATIENT
Start: 2022-10-24 | End: 2022-10-24

## 2022-10-24 RX ORDER — IPRATROPIUM/ALBUTEROL SULFATE 18-103MCG
3 AEROSOL WITH ADAPTER (GRAM) INHALATION ONCE
Refills: 0 | Status: COMPLETED | OUTPATIENT
Start: 2022-10-24 | End: 2022-10-24

## 2022-10-24 RX ORDER — ALBUTEROL 90 UG/1
2 AEROSOL, METERED ORAL
Qty: 1 | Refills: 0
Start: 2022-10-24 | End: 2022-10-30

## 2022-10-24 RX ORDER — KETOROLAC TROMETHAMINE 30 MG/ML
15 SYRINGE (ML) INJECTION ONCE
Refills: 0 | Status: DISCONTINUED | OUTPATIENT
Start: 2022-10-24 | End: 2022-10-24

## 2022-10-24 RX ADMIN — Medication 650 MILLIGRAM(S): at 20:32

## 2022-10-24 RX ADMIN — Medication 3 MILLILITER(S): at 20:32

## 2022-10-24 RX ADMIN — Medication 3 MILLILITER(S): at 22:01

## 2022-10-24 RX ADMIN — Medication 15 MILLIGRAM(S): at 20:31

## 2022-10-24 RX ADMIN — Medication 50 MILLIGRAM(S): at 22:01

## 2022-10-24 RX ADMIN — SODIUM CHLORIDE 1000 MILLILITER(S): 9 INJECTION INTRAMUSCULAR; INTRAVENOUS; SUBCUTANEOUS at 20:32

## 2022-10-24 NOTE — ED PROVIDER NOTE - PATIENT PORTAL LINK FT
You can access the FollowMyHealth Patient Portal offered by [facility] by registering at the following website: [email or web address]. By joining [employer]’s FollowMyHealth portal, you will also be able to view your health information using other applications (apps) compatible with our system.

## 2022-10-24 NOTE — ED PROVIDER NOTE - NSFOLLOWUPINSTRUCTIONS_ED_ALL_ED_FT
1. TAKE ALL MEDICATIONS AS DIRECTED.    2. FOR PAIN OR FEVER YOU CAN TAKE IBUPROFEN (MOTRIN, ADVIL) OR ACETAMINOPHEN (TYLENOL) AS NEEDED, AS DIRECTED ON PACKAGING.  3. FOLLOW UP WITH YOUR PRIMARY DOCTOR WITHIN 5 DAYS AS DIRECTED.  4. IF YOU HAD LABS OR IMAGING DONE, YOU WERE GIVEN COPIES OF ALL LABS AND/OR IMAGING RESULTS FROM YOUR ER VISIT--PLEASE TAKE THEM WITH YOU TO YOUR FOLLOW UP APPOINTMENTS.  5. IF NEEDED, CALL PATIENT ACCESS SERVICES AT [phone] TO FIND A PRIMARY CARE PHYSICIAN.  OR CALL [phone] TO MAKE AN APPOINTMENT WITH THE CLINIC.  6. RETURN TO THE ER FOR ANY WORSENING SYMPTOMS OR CONCERNS.    Take prednisone once a day for 4 days starting tomorrow. USe albuterol inhaler every 4-6 hours as needed for cough and wheezing  Return to ED for worsening symptoms, persistent fevers, difficulty breathing or other concerns      [facility]                                                                                                Upper Respiratory Infection, Adult      An upper respiratory infection (URI) is a common viral infection of the nose, throat, and upper air passages that lead to the lungs. The most common type of URI is the common cold. URIs usually get better on their own, without medical treatment.      What are the causes?    A URI is caused by a virus. You may catch a virus by:  •Breathing in droplets from an infected person's cough or sneeze.      •Touching something that has been exposed to the virus (is contaminated) and then touching your mouth, nose, or eyes.        What increases the risk?    You are more likely to get a URI if:  •You are very young or very old.      •You have close contact with others, such as at work, school, or a health care facility.      •You smoke.      •You have long-term (chronic) heart or lung disease.      •You have a weakened disease-fighting system (immune system).      •You have nasal allergies or asthma.      •You are experiencing a lot of stress.      •You have poor nutrition.        What are the signs or symptoms?    A URI usually involves some of the following symptoms:  •Runny or stuffy (congested) nose.      •Cough.      •Sneezing.      •Sore throat.      •Headache.      •Fatigue.      •Fever.      •Loss of appetite.      •Pain in your forehead, behind your eyes, and over your cheekbones (sinus pain).      •Muscle aches.      •Redness or irritation of the eyes.      •Pressure in the ears or face.        How is this diagnosed?    This condition may be diagnosed based on your medical history and symptoms, and a physical exam. Your health care provider may use a swab to take a mucus sample from your nose (nasal swab). This sample can be tested to determine what virus is causing the illness.      How is this treated?    URIs usually get better on their own within 7–10 days. Medicines cannot cure URIs, but your health care provider may recommend certain medicines to help relieve symptoms, such as:  •Over-the-counter cold medicines.      •Cough suppressants. Coughing is a type of defense against infection that helps to clear the respiratory system, so take these medicines only as recommended by your health care provider.      •Fever-reducing medicines.        Follow these instructions at home:    Activity     •Rest as needed.      •If you have a fever, stay home from work or school until your fever is gone or until your health care provider says your URI cannot spread to other people (is no longer contagious). Your health care provider may have you wear a face mask to prevent your infection from spreading.      Relieving symptoms     •Gargle with a mixture of salt and water 3–4 times a day or as needed. To make salt water, completely dissolve ½–1 tsp (3–6 g) of salt in 1 cup (237 mL) of warm water.      •Use a cool-mist humidifier to add moisture to the air. This can help you breathe more easily.        Eating and drinking   A comparison of three sample cups showing dark yellow, yellow, and pale yellow urine.   •Drink enough fluid to keep your urine pale yellow.      •Eat soups and other clear broths.        General instructions   A sign showing that a person should not smoke.   •Take over-the-counter and prescription medicines only as told by your health care provider. These include cold medicines, fever reducers, and cough suppressants.      • Do not use any products that contain nicotine or tobacco. These products include cigarettes, chewing tobacco, and vaping devices, such as e-cigarettes. If you need help quitting, ask your health care provider.      •Stay away from secondhand smoke.      •Stay up to date on all immunizations, including the yearly (annual) flu vaccine.      •Keep all follow-up visits. This is important.        How to prevent the spread of infection to others   Washing hands with soap and water.   URIs can be contagious. To prevent the infection from spreading:  •Wash your hands with soap and water for at least 20 seconds. If soap and water are not available, use hand .      •Avoid touching your mouth, face, eyes, or nose.      •Cough or sneeze into a tissue or your sleeve or elbow instead of into your hand or into the air.        Contact a health care provider if:    •You are getting worse instead of better.      •You have a fever or chills.      •Your mucus is brown or red.      •You have yellow or brown discharge coming from your nose.      •You have pain in your face, especially when you bend forward.      •You have swollen neck glands.      •You have pain while swallowing.      •You have white areas in the back of your throat.        Get help right away if:    •You have shortness of breath that gets worse.    •You have severe or persistent:  •Headache.      •Ear pain.      •Sinus pain.      •Chest pain.      •You have chronic lung disease along with any of the following:  •Making high-pitched whistling sounds when you breathe, most often when you breathe out (wheezing).      •Prolonged cough (more than 14 days).      •Coughing up blood.      •A change in your usual mucus.        •You have a stiff neck.    •You have changes in your:  •Vision.      •Hearing.      •Thinking.      •Mood.        These symptoms may be an emergency. Get help right away. Call 911.   • Do not wait to see if the symptoms will go away.       • Do not drive yourself to the hospital.         Summary    •An upper respiratory infection (URI) is a common infection of the nose, throat, and upper air passages that lead to the lungs.      •A URI is caused by a virus.      •URIs usually get better on their own within 7–10 days.      •Medicines cannot cure URIs, but your health care provider may recommend certain medicines to help relieve symptoms.      This information is not intended to replace advice given to you by your health care provider. Make sure you discuss any questions you have with your health care provider.      Document Revised: 07/20/2022 Document Reviewed: 07/20/2022    Elsevier Patient Education © 2022 [employer]

## 2022-10-24 NOTE — ED PROVIDER NOTE - PHYSICAL EXAMINATION
Gen: Well appearing in NAD  Head: NC/AT  Neck: trachea midline  cv: tachycardic   Resp:  No distress, + diffuse wheezing  abd nontender   Ext: no deformities  Neuro:  A&O appears non focal  Skin:  Warm and dry as visualized  Psych:  Normal affect and mood

## 2022-10-24 NOTE — ED ADULT NURSE NOTE - OBJECTIVE STATEMENT
Pt received to intake room 3. Pt is A&Ox4, ambulatory at baseline. Pt with c/o flu like symptoms x1day. C/o fevers, chills, cough and headache. Denies any sick contacts. RR even and unlabored, pt satting 98% on RA. IV access established with 20G to L AC, labs sent as per orders. Pt in NAD. Duoneb in progress. Stretcher locked and in lowest position, safety measures in place

## 2022-10-24 NOTE — ED PROVIDER NOTE - OBJECTIVE STATEMENT
53yo M ho ?Asthma, pw 1 day fevers, body aches, back pain, neck pain, headache, cough, chest pain, pt feeling weak, called ems, did not take any pain meds or antipyretics

## 2022-10-24 NOTE — ED PROVIDER NOTE - CLINICAL SUMMARY MEDICAL DECISION MAKING FREE TEXT BOX
53yo M with fevers, aches, cough, likely viral syndrome, will check basic labs, xr, ekg, supportive care

## 2022-10-25 LAB
FLUAV AG NPH QL: SIGNIFICANT CHANGE UP
FLUBV AG NPH QL: SIGNIFICANT CHANGE UP
RSV RNA NPH QL NAA+NON-PROBE: SIGNIFICANT CHANGE UP
SARS-COV-2 RNA SPEC QL NAA+PROBE: DETECTED

## 2025-06-06 NOTE — ED ADULT NURSE NOTE - PMH
Ari Snider is a 89 y.o. male admitted for  Principal Problem:    Acute diastolic CHF (congestive heart failure) (HCC)  Active Problems:    Chest pain    Acute on chronic systolic CHF (congestive heart failure) (HCC)  Resolved Problems:    * No resolved hospital problems. *  .   Patient Home via family with   Chief Complaint   Patient presents with    Chest Pain     On and off since 0930 this morning. Hx MI.    .  Patient is alert and Person, Place, Time, and Situation  Patient's baseline mobility: with assistance  Code Status: Prior   Cardiac Rhythm:       Is patient on baseline Oxygen: no how many Liters:   Abnormal Assessment Findings: chest pain on and off    Isolation: None      NIH Score:    C-SSRS: Risk of Suicide: No Risk  Bedside swallow:        Active LDA's:   Peripheral IV 06/06/25 Right Wrist (Active)           Family/Caregiver Present yes Any Concerns: no   Restraints no  Sitter no         Vitals: MEWS Score: 1    Vitals:    06/06/25 1350 06/06/25 1351 06/06/25 1507   BP:  (!) 149/77 122/67   Pulse:  83 67   Resp:  18 16   Temp:  98.6 °F (37 °C)    SpO2:  94% 100%   Weight: 105.5 kg (232 lb 9.6 oz)         Last documented pain score (0-10 scale)    Pain medication administered Yes- see MAR.    Pertinent or High Risk Medications/Drips: No.    Pending Blood Product Administration: no    Abnormal labs:   Abnormal Labs Reviewed   CBC WITH AUTO DIFFERENTIAL - Abnormal; Notable for the following components:       Result Value    RBC 3.22 (*)     Hemoglobin 10.6 (*)     Hematocrit 31.5 (*)     Poikilocytes Occasional (*)     All other components within normal limits   COMPREHENSIVE METABOLIC PANEL W/ REFLEX TO MG FOR LOW K - Abnormal; Notable for the following components:    Glucose 167 (*)     BUN 35 (*)     Creatinine 1.4 (*)     Est, Glom Filt Rate 48 (*)     All other components within normal limits   TROPONIN - Abnormal; Notable for the following components:    Troponin, High Sensitivity 82 (*)      No pertinent past medical history <<----- Click to add NO pertinent Past Medical History